# Patient Record
Sex: FEMALE | URBAN - METROPOLITAN AREA
[De-identification: names, ages, dates, MRNs, and addresses within clinical notes are randomized per-mention and may not be internally consistent; named-entity substitution may affect disease eponyms.]

---

## 2020-08-03 ENCOUNTER — VIRTUAL VISIT (OUTPATIENT)
Dept: FAMILY MEDICINE | Facility: OTHER | Age: 35
End: 2020-08-03

## 2020-08-04 NOTE — PROGRESS NOTES
"Date: 2020 09:51:01  Clinician: Allison Daniels  Clinician NPI: [id number]  Patient: Radha Saab  Patient : 1985  Patient Address: [address] [place] [address]  Patient Phone: [phone]  Visit Protocol: URI  Patient Summary:  Radha is a 35 year old ( : 1985 ) female who initiated a Visit for COVID-19 (Coronavirus) evaluation and screening. When asked the question \"Please sign me up to receive news, health information and promotions. \", Radha responded \"No\".    Radha states her symptoms started gradually 5-6 days ago. After her symptoms started, they improved and then got worse again.   Her symptoms consist of wheezing, ageusia, anosmia, facial pain or pressure, a cough, and malaise. She is experiencing mild difficulty breathing with activities but can speak normally in full sentences.   Symptom details     Cough: Radha coughs every 5-10 minutes and her cough is more bothersome at night. Phlegm does not come into her throat when she coughs. She does not believe her cough is caused by post-nasal drip.     Wheezing: Radha has been diagnosed with asthma. Additional wheezing details as reported by the patient (free text): N/A       Facial pain or pressure: The facial pain or pressure feels worse when bending over or leaning forward.      Radha denies having nausea, teeth pain, diarrhea, myalgias, sore throat, fever, nasal congestion, vomiting, rhinitis, ear pain, headache, chills, and enlarged lymph nodes. She also denies having recent facial or sinus surgery in the past 60 days and taking antibiotic medication in the past month.   Precipitating events  She has not recently been exposed to someone with influenza. Radha has been in close contact with the following high risk individuals: people with asthma, heart disease or diabetes and children under the age of 5.   Pertinent COVID-19 (Coronavirus) information  In the past 14 days, Radha has not worked in a congregate living setting. "   She does not work or volunteer as healthcare worker or a  and does not work or volunteer in a healthcare facility.   Radha also has not lived in a congregate living setting in the past 14 days. She does not live with a healthcare worker.   Radha has not had a close contact with a laboratory-confirmed COVID-19 patient within 14 days of symptom onset.   Pertinent medical history  Radha had 1 sinus infection within the past year.   Radha does not get yeast infections when she takes antibiotics.   Radha needs a return to work/school note.   Weight: 210 lbs   Radha does not smoke or use smokeless tobacco.   She denies pregnancy and denies breastfeeding. She has menstruated in the past month.   Weight: 210 lbs    MEDICATIONS: No current medications, ALLERGIES: NKDA  Clinician Response:  Dear Radha,   Your symptoms show that you may have coronavirus (COVID-19). This illness can cause fever, cough and trouble breathing. Many people get a mild case and get better on their own. Some people can get very sick.  Based on the symptoms you have shared, I would like you to be re-checked in 2 to 3 days. Please call your family clinic to set up a video or phone visit.  Will I be tested for COVID-19?  We would like to test you for this virus.   Please call [phone] to schedule your visit. Explain that you were referred by [facility] to have a COVID-19 test. Be ready to share [facility].   The following will serve as your written order for this COVID Test, ordered by me, for the indication of suspected COVID [Z20.828]: The test will be ordered in [employer], our electronic health record, after you are scheduled. It will show as ordered and authorized by Barak Hernandez MD.  Order: COVID-19 (Coronavirus) PCR for SYMPTOMATIC testing from [facility].  1.When it's time for your COVID test:   Stay at least 6 feet away from others. (If someone will drive you to your test, stay in the backseat, as far away from the   "as you can.)   Cover your mouth and nose with a mask, tissue or washcloth.  Go straight to the testing site. Don't make any stops on the way there or back.      2.Starting now: Stay home and away from others (self-isolate) until:   You've had no fever---and no medicine that reduces fever---for 3 full days (72 hours). And...   Your other symptoms have gotten better. For example, your cough or breathing has improved. And...   At least 10 days have passed since your symptoms started.       During this time, don't leave the house except for testing or medical care.   Stay in your own room, even for meals. Use your own bathroom if you can.   Stay away from others in your home. No hugging, kissing or shaking hands. No visitors.  Don't go to work, school or anywhere else.    Clean \"high touch\" surfaces often (doorknobs, counters, handles, etc.). Use a household cleaning spray or wipes. You'll find a full list of  on the EPA website: www.epa.gov/pesticide-registration/list-n-disinfectants-use-against-sars-cov-2.   Cover your mouth and nose with a mask, tissue or washcloth to avoid spreading germs.  Wash your hands and face often. Use soap and water.  Caregivers in these groups are at risk for severe illness due to COVID-19:  o People 65 years and older  o People who live in a nursing home or long-term care facility  o People with chronic disease (lung, heart, cancer, diabetes, kidney, liver, immunologic)   o People who have a weakened immune system, including those who:   Are in cancer treatment  Take medicine that weakens the immune system, such as corticosteroids  Had a bone marrow or organ transplant  Have an immune deficiency  Have poorly controlled HIV or AIDS  Are obese (body mass index of 40 or higher)  Smoke regularly   o Caregivers should wear gloves while washing dishes, handling laundry and cleaning bedrooms and bathrooms.  o Use caution when washing and drying laundry: Don't shake dirty laundry, and use " the warmest water setting that you can.  o For more tips, go to www.cdc.gov/coronavirus/2019-ncov/downloads/10Things.pdf.      How can I take care of myself?   Get lots of rest. Drink extra fluids (unless a doctor has told you not to)   Take Tylenol (acetaminophen) for fever or pain. If you have liver or kidney problems, ask your family doctor if it's okay to take Tylenol.   Adults can take either:    650 mg (two 325 mg pills) every 4 to 6 hours, or...   1,000 mg (two 500 mg pills) every 8 hours as needed.    Note: Don't take more than 3,000 mg in one day. Acetaminophen is found in many medicines (both prescribed and over-the-counter medicines). Read all labels to be sure you don't take too much.   For children, check the Tylenol bottle for the right dose. The dose is based on the child's age or weight.    If you have other health problems (like cancer, heart failure, an organ transplant or severe kidney disease): Call your specialty clinic if you don't feel better in the next 2 days.       Know when to call 911. Emergency warning signs include:    Trouble breathing or shortness of breath Pain or pressure in the chest that doesn't go away Feeling confused like you haven't felt before, or not being able to wake up Bluish-colored lips or face  Where can I get more information?   [facility] -- About COVID-19: [email or web address]   CDC -- What to Do If You're Sick: www.cdc.gov/coronavirus/2019-ncov/about/steps-when-sick.html   CDC -- Ending Home Isolation: www.cdc.gov/coronavirus/2019-ncov/hcp/disposition-in-home-patients.html   CDC -- Caring for Someone: www.cdc.gov/coronavirus/2019-ncov/if-you-are-sick/care-for-someone.html   [facility] -- Interim Guidance for Hospital Discharge to Home: www.health.[facility].mn.us/diseases/coronavirus/hcp/hospdischarge.pdf   [facility] clinical trials (COVID-19 research studies): [email or web address].[email or web address]/umn-clinical-trials    Below are the COVID-19 hotlines at  the [facility] ([facility]). Interpreters are available.    For health questions: Call 703-485-5176 or 1-319.750.3983 (7 a.m. to 7 p.m.) For questions about schools and childcare: Call 094-479-1987 or 1-819.300.2487 (7 a.m. to 7 p.m.)          Diagnosis: Wheezing  Diagnosis ICD: R06.2  Prescription: prednisone 20 mg oral tablet 10 tablet, 5 days supply. Take 2 tablets by mouth 1 time per day for 5 days. Refills: 0, Refill as needed: no, Allow substitutions: yes  Prescription: albuterol sulfate (Ventolin HFA) 90 mcg/actuation inhalation HFA aerosol inhaler 1 60 inhalation canister (ventolin hfa or equivalent), 0 days supply. Inhale 2 puffs every 4 hours as needed. Refills: 0, Refill as needed: no, Allow substitutions: yes  Pharmacy: [id number] - [phone] - [address],  [place] [phone]

## 2020-08-10 ENCOUNTER — NURSE TRIAGE (OUTPATIENT)
Dept: NURSING | Facility: CLINIC | Age: 35
End: 2020-08-10

## 2020-08-10 NOTE — TELEPHONE ENCOUNTER
"Martin (boyfriend) is the caller, with pt immediately present.  Asks for Covid PCR results, however no results visible.  States \"Her green card name is 'Briana Carrillo'.\"  \"Not sure which name we used when registering her for Covid swab test.\"    Caller decides to do another [facility] virtual visit with a provider to request re-testing for purposes of pt's employment.    [facility] Nurse Advisor     Additional Information    General information question, no triage required and triager able to answer question    Protocols used: INFORMATION ONLY CALL-A-AH      "